# Patient Record
Sex: MALE
[De-identification: names, ages, dates, MRNs, and addresses within clinical notes are randomized per-mention and may not be internally consistent; named-entity substitution may affect disease eponyms.]

---

## 2024-05-20 PROBLEM — Z00.00 ENCOUNTER FOR PREVENTIVE HEALTH EXAMINATION: Status: ACTIVE | Noted: 2024-05-20

## 2024-05-21 ENCOUNTER — APPOINTMENT (OUTPATIENT)
Dept: GASTROENTEROLOGY | Facility: CLINIC | Age: 24
End: 2024-05-21
Payer: COMMERCIAL

## 2024-05-21 VITALS
DIASTOLIC BLOOD PRESSURE: 111 MMHG | RESPIRATION RATE: 14 BRPM | HEIGHT: 63 IN | HEART RATE: 63 BPM | BODY MASS INDEX: 24.45 KG/M2 | WEIGHT: 138 LBS | SYSTOLIC BLOOD PRESSURE: 170 MMHG | OXYGEN SATURATION: 98 %

## 2024-05-21 DIAGNOSIS — F17.210 NICOTINE DEPENDENCE, CIGARETTES, UNCOMPLICATED: ICD-10-CM

## 2024-05-21 DIAGNOSIS — R79.89 OTHER SPECIFIED ABNORMAL FINDINGS OF BLOOD CHEMISTRY: ICD-10-CM

## 2024-05-21 DIAGNOSIS — K92.0 HEMATEMESIS: ICD-10-CM

## 2024-05-21 DIAGNOSIS — Z78.9 OTHER SPECIFIED HEALTH STATUS: ICD-10-CM

## 2024-05-21 PROCEDURE — 99205 OFFICE O/P NEW HI 60 MIN: CPT

## 2024-05-21 NOTE — HISTORY OF PRESENT ILLNESS
[de-identified] : LISA CASTELAN is a 23 year old male with no significant PMH who presents today for evaluation of elevated LFTs and hematemesis. Labs 4/24/24 -  tbili 0.2, AST 41, ALT 30, . No abdominal imaging done. He reports a history of binge drinking, 6+ drinks/week. After drinking, he reports having 3 episodes of hematemesis. He has not gone to the ER. H/H 17.6/52.5. No previous EGD. Denies recent infection, abdominal pain or distension, jaundice, hematochezia, dark urine, confusion or unintentional weight loss. Denies otc/herbal supplements. Denies family history of liver disease.

## 2024-05-21 NOTE — PHYSICAL EXAM
[Non-Tender] : non-tender [Smooth] : smooth [General Appearance - Alert] : alert [General Appearance - In No Acute Distress] : in no acute distress [Sclera] : the sclera and conjunctiva were normal [Outer Ear] : the ears and nose were normal in appearance [Oropharynx] : the oropharynx was normal [Neck Appearance] : the appearance of the neck was normal [Bowel Sounds] : normal bowel sounds [Abdomen Soft] : soft [Abdomen Tenderness] : non-tender [] : no hepato-splenomegaly [Abdomen Mass (___ Cm)] : no abdominal mass palpated [Oriented To Time, Place, And Person] : oriented to person, place, and time [Scleral Icterus] : No Scleral Icterus [Spider Angioma] : No spider angioma(s) were observed [Abdominal  Ascites] : no ascites [Asterixis] : no asterixis observed [Jaundice] : No jaundice [Palmar Erythema] : no Palmar Erythema

## 2024-05-21 NOTE — REASON FOR VISIT
[Consultation] : a consultation visit [[employer] ] : provided by [employer]   [Time Spent: ____ minutes] : Total time spent using  services: [unfilled] minutes. The patient's primary language is not English thus required  services. [FreeTextEntry1] : elevated LFTs, hematemesis [Interpreters_IDNumber] : [id number] [Interpreters_FullName] : Naun [TWNoteComboBox1] : Salvadorean

## 2024-05-21 NOTE — ASSESSMENT
[FreeTextEntry1] : LISA CASTELAN is a 23 year old male with no significant PMH who presents today for evaluation of elevated LFTs and hematemesis.   Elevated LFTs -Labs 4/24/24 - tbili 0.2, AST 41, ALT 30, .  -likely alcohol associated hepatitis -Labs ordered to rule out competing etiologies of liver disease -CT Abdomen w/IVC ordered to further evaluate, given hematemesis -advised pt to cut down on alcohol consumption  Hematemesis -labs and CT ordered -f/u w/GI  Follow up in 6-8 weeks

## 2024-05-22 ENCOUNTER — LABORATORY RESULT (OUTPATIENT)
Age: 24
End: 2024-05-22

## 2024-05-23 ENCOUNTER — OFFICE (OUTPATIENT)
Dept: URBAN - METROPOLITAN AREA CLINIC 94 | Facility: CLINIC | Age: 24
Setting detail: OPHTHALMOLOGY
End: 2024-05-23
Payer: COMMERCIAL

## 2024-05-23 DIAGNOSIS — H11.041: ICD-10-CM

## 2024-05-23 DIAGNOSIS — H18.613: ICD-10-CM

## 2024-05-23 PROCEDURE — 92014 COMPRE OPH EXAM EST PT 1/>: CPT | Performed by: OPHTHALMOLOGY

## 2024-05-23 PROCEDURE — 92025 CPTRIZED CORNEAL TOPOGRAPHY: CPT | Performed by: OPHTHALMOLOGY

## 2024-05-23 ASSESSMENT — CONFRONTATIONAL VISUAL FIELD TEST (CVF)
OS_FINDINGS: FULL
OD_FINDINGS: FULL

## 2024-05-28 LAB
A1AT SERPL-MCNC: 130 MG/DL
AFP-TM SERPL-MCNC: 2.2 NG/ML
ALBUMIN SERPL ELPH-MCNC: 4.7 G/DL
ALP BLD-CCNC: 106 U/L
ALT SERPL-CCNC: 20 U/L
ANA SER IF-ACNC: NEGATIVE
ANION GAP SERPL CALC-SCNC: 15 MMOL/L
AST SERPL-CCNC: 24 U/L
BASOPHILS # BLD AUTO: 0.05 K/UL
BASOPHILS NFR BLD AUTO: 0.6 %
BILIRUB INDIRECT SERPL-MCNC: 0.2 MG/DL
BILIRUB SERPL-MCNC: 0.3 MG/DL
BUN SERPL-MCNC: 9 MG/DL
CALCIUM SERPL-MCNC: 9.6 MG/DL
CERULOPLASMIN SERPL-MCNC: 19 MG/DL
CHLORIDE SERPL-SCNC: 102 MMOL/L
CO2 SERPL-SCNC: 24 MMOL/L
CREAT SERPL-MCNC: 1.07 MG/DL
EGFR: 101 ML/MIN/1.73M2
EOSINOPHIL # BLD AUTO: 0.23 K/UL
EOSINOPHIL NFR BLD AUTO: 2.8 %
FERRITIN SERPL-MCNC: 60 NG/ML
GLUCOSE SERPL-MCNC: 98 MG/DL
HBV CORE IGG+IGM SER QL: NONREACTIVE
HBV SURFACE AB SER QL: NONREACTIVE
HBV SURFACE AG SER QL: NONREACTIVE
HCT VFR BLD CALC: 48.1 %
HCV AB SER QL: NONREACTIVE
HCV S/CO RATIO: 0.12 S/CO
HEPATITIS A IGG ANTIBODY: REACTIVE
HGB BLD-MCNC: 16.7 G/DL
IGG SER QL IEP: 863 MG/DL
IMM GRANULOCYTES NFR BLD AUTO: 0.4 %
INR PPP: 0.91 RATIO
IRON SATN MFR SERPL: 15 %
IRON SERPL-MCNC: 46 UG/DL
LKM AB SER QL IF: <20.1 UNITS
LYMPHOCYTES # BLD AUTO: 2.8 K/UL
LYMPHOCYTES NFR BLD AUTO: 34.4 %
MAN DIFF?: NORMAL
MCHC RBC-ENTMCNC: 31.7 PG
MCHC RBC-ENTMCNC: 34.7 GM/DL
MCV RBC AUTO: 91.3 FL
MITOCHONDRIA AB SER IF-ACNC: NORMAL
MONOCYTES # BLD AUTO: 0.89 K/UL
MONOCYTES NFR BLD AUTO: 10.9 %
NEUTROPHILS # BLD AUTO: 4.15 K/UL
NEUTROPHILS NFR BLD AUTO: 50.9 %
NT-PROBNP SERPL-MCNC: <36 PG/ML
PLATELET # BLD AUTO: 214 K/UL
POTASSIUM SERPL-SCNC: 3.8 MMOL/L
PROT SERPL-MCNC: 7 G/DL
PT BLD: 10.3 SEC
RBC # BLD: 5.27 M/UL
RBC # FLD: 12.3 %
SMOOTH MUSCLE AB SER QL IF: NORMAL
SODIUM SERPL-SCNC: 141 MMOL/L
TIBC SERPL-MCNC: 316 UG/DL
TSH SERPL-ACNC: 1.89 UIU/ML
UIBC SERPL-MCNC: 270 UG/DL
WBC # FLD AUTO: 8.15 K/UL

## 2024-05-29 LAB — SOLUBLE LIVER IGG SER IA-ACNC: 0.7

## 2024-05-31 ENCOUNTER — APPOINTMENT (OUTPATIENT)
Dept: CT IMAGING | Facility: CLINIC | Age: 24
End: 2024-05-31
Payer: COMMERCIAL

## 2024-05-31 ENCOUNTER — OUTPATIENT (OUTPATIENT)
Dept: OUTPATIENT SERVICES | Facility: HOSPITAL | Age: 24
LOS: 1 days | End: 2024-05-31
Payer: COMMERCIAL

## 2024-05-31 DIAGNOSIS — K92.0 HEMATEMESIS: ICD-10-CM

## 2024-05-31 PROCEDURE — 74160 CT ABDOMEN W/CONTRAST: CPT | Mod: 26

## 2024-05-31 PROCEDURE — 74160 CT ABDOMEN W/CONTRAST: CPT

## 2024-06-24 ENCOUNTER — OFFICE (OUTPATIENT)
Dept: URBAN - METROPOLITAN AREA CLINIC 104 | Facility: CLINIC | Age: 24
Setting detail: OPHTHALMOLOGY
End: 2024-06-24
Payer: COMMERCIAL

## 2024-06-24 ENCOUNTER — RX ONLY (RX ONLY)
Age: 24
End: 2024-06-24

## 2024-06-24 DIAGNOSIS — H11.152: ICD-10-CM

## 2024-06-24 DIAGNOSIS — H18.613: ICD-10-CM

## 2024-06-24 DIAGNOSIS — H11.041: ICD-10-CM

## 2024-06-24 PROCEDURE — 99213 OFFICE O/P EST LOW 20 MIN: CPT | Performed by: OPHTHALMOLOGY

## 2024-06-24 PROCEDURE — 92025 CPTRIZED CORNEAL TOPOGRAPHY: CPT | Performed by: OPHTHALMOLOGY

## 2024-06-24 PROCEDURE — 76514 ECHO EXAM OF EYE THICKNESS: CPT | Performed by: OPHTHALMOLOGY

## 2024-06-24 PROCEDURE — 92285 EXTERNAL OCULAR PHOTOGRAPHY: CPT | Performed by: OPHTHALMOLOGY

## 2024-06-24 ASSESSMENT — CONFRONTATIONAL VISUAL FIELD TEST (CVF)
OS_FINDINGS: FULL
OD_FINDINGS: FULL

## 2024-06-25 PROBLEM — H18.611 KERATOCONUS, STABLE; RIGHT EYE: Status: ACTIVE | Noted: 2024-06-24

## 2024-08-06 ENCOUNTER — APPOINTMENT (OUTPATIENT)
Dept: GASTROENTEROLOGY | Facility: CLINIC | Age: 24
End: 2024-08-06

## 2024-08-06 PROCEDURE — 99214 OFFICE O/P EST MOD 30 MIN: CPT

## 2024-08-08 NOTE — HISTORY OF PRESENT ILLNESS
[de-identified] : LISA CASTELAN is a 23 year old male with no significant PMH who presents today for evaluation of elevated LFTs and hematemesis.   8/6/24: f/u visit. Labs and imaging reviewed w/pt. LFTs have normalized. Competing etiologies of liver disease have been ruled out including autoimmune liver disease, viral hepatitis, iron overload, Wilsons disease and A1AT deficiency. CT Abdomen w/IVC 5/31/24 was unremarkable. He has not made an appt w/GI to evaluate hematemesis yet.  5/21/24: Labs 4/24/24 -  tbili 0.2, AST 41, ALT 30, . No abdominal imaging done. He reports a history of binge drinking, 6+ drinks/week. After drinking, he reports having 3 episodes of hematemesis. He has not gone to the ER. H/H 17.6/52.5. No previous EGD. Denies recent infection, abdominal pain or distension, jaundice, hematochezia, dark urine, confusion or unintentional weight loss. Denies otc/herbal supplements. Denies family history of liver disease.

## 2024-08-08 NOTE — HISTORY OF PRESENT ILLNESS
[de-identified] : LISA CASTELAN is a 23 year old male with no significant PMH who presents today for evaluation of elevated LFTs and hematemesis.   8/6/24: f/u visit. Labs and imaging reviewed w/pt. LFTs have normalized. Competing etiologies of liver disease have been ruled out including autoimmune liver disease, viral hepatitis, iron overload, Wilsons disease and A1AT deficiency. CT Abdomen w/IVC 5/31/24 was unremarkable. He has not made an appt w/GI to evaluate hematemesis yet.  5/21/24: Labs 4/24/24 -  tbili 0.2, AST 41, ALT 30, . No abdominal imaging done. He reports a history of binge drinking, 6+ drinks/week. After drinking, he reports having 3 episodes of hematemesis. He has not gone to the ER. H/H 17.6/52.5. No previous EGD. Denies recent infection, abdominal pain or distension, jaundice, hematochezia, dark urine, confusion or unintentional weight loss. Denies otc/herbal supplements. Denies family history of liver disease.

## 2024-08-08 NOTE — REASON FOR VISIT
[Follow-Up: _____] : a [unfilled] follow-up visit [[employer] ] : provided by [employer]   [Time Spent: ____ minutes] : Total time spent using  services: [unfilled] minutes. The patient's primary language is not English thus required  services. [FreeTextEntry1] : elevated LFTs [Interpreters_IDNumber] : [id number] [Interpreters_FullName] : Beckie [TWNoteComboBox1] : Surinamese intact

## 2024-08-08 NOTE — REASON FOR VISIT
[Follow-Up: _____] : a [unfilled] follow-up visit [[employer] ] : provided by [employer]   [Time Spent: ____ minutes] : Total time spent using  services: [unfilled] minutes. The patient's primary language is not English thus required  services. [FreeTextEntry1] : elevated LFTs [Interpreters_IDNumber] : [id number] [Interpreters_FullName] : Beckie [TWNoteComboBox1] : Martiniquais

## 2024-08-08 NOTE — PHYSICAL EXAM
[Non-Tender] : non-tender [Smooth] : smooth [General Appearance - In No Acute Distress] : in no acute distress [General Appearance - Alert] : alert [Sclera] : the sclera and conjunctiva were normal [Outer Ear] : the ears and nose were normal in appearance [Oropharynx] : the oropharynx was normal [Neck Appearance] : the appearance of the neck was normal [Bowel Sounds] : normal bowel sounds [Abdomen Tenderness] : non-tender [Abdomen Soft] : soft [] : no hepato-splenomegaly [Abdomen Mass (___ Cm)] : no abdominal mass palpated [Oriented To Time, Place, And Person] : oriented to person, place, and time [Scleral Icterus] : No Scleral Icterus [Spider Angioma] : No spider angioma(s) were observed [Abdominal  Ascites] : no ascites [Asterixis] : no asterixis observed [Jaundice] : No jaundice [Palmar Erythema] : no Palmar Erythema

## 2024-08-08 NOTE — ASSESSMENT
[FreeTextEntry1] : LISA CASTELAN is a 23 year old male with no significant PMH who presents today for evaluation of elevated LFTs and hematemesis.   Elevated LFTs -LFTs have normalized. Competing etiologies of liver disease have been ruled out including autoimmune liver disease, viral hepatitis, iron overload, Wilsons disease and A1AT deficiency. CT Abdomen w/IVC 5/31/24 was unremarkable. -likely alcohol associated hepatitis that has now resolved -advised abstinence from alcohol -Follow up w/hepatology PRN  Hematemesis -f/u w/GI

## 2024-08-15 ENCOUNTER — ASC (OUTPATIENT)
Dept: URBAN - METROPOLITAN AREA SURGERY 8 | Facility: SURGERY | Age: 24
Setting detail: OPHTHALMOLOGY
End: 2024-08-15
Payer: COMMERCIAL

## 2024-08-15 DIAGNOSIS — H11.041: ICD-10-CM

## 2024-08-15 PROCEDURE — 68326 REVISE/GRAFT EYELID LINING: CPT | Mod: RT | Performed by: OPHTHALMOLOGY

## 2024-08-15 PROCEDURE — 65426 REMOVAL OF EYE LESION: CPT | Mod: RT | Performed by: OPHTHALMOLOGY

## 2024-08-16 ENCOUNTER — OFFICE (OUTPATIENT)
Dept: URBAN - METROPOLITAN AREA CLINIC 104 | Facility: CLINIC | Age: 24
Setting detail: OPHTHALMOLOGY
End: 2024-08-16
Payer: COMMERCIAL

## 2024-08-16 ENCOUNTER — APPOINTMENT (OUTPATIENT)
Dept: GASTROENTEROLOGY | Facility: CLINIC | Age: 24
End: 2024-08-16
Payer: COMMERCIAL

## 2024-08-16 VITALS — OXYGEN SATURATION: 98 % | HEIGHT: 63 IN | BODY MASS INDEX: 25.34 KG/M2 | RESPIRATION RATE: 14 BRPM | WEIGHT: 143 LBS

## 2024-08-16 VITALS — SYSTOLIC BLOOD PRESSURE: 140 MMHG | DIASTOLIC BLOOD PRESSURE: 100 MMHG | HEART RATE: 75 BPM

## 2024-08-16 DIAGNOSIS — K92.0 HEMATEMESIS: ICD-10-CM

## 2024-08-16 DIAGNOSIS — H11.041: ICD-10-CM

## 2024-08-16 DIAGNOSIS — Z78.9 OTHER SPECIFIED HEALTH STATUS: ICD-10-CM

## 2024-08-16 DIAGNOSIS — Z09 ENCOUNTER FOR FOLLOW-UP EXAMINATION AFTER COMPLETED TREATMENT FOR CONDITIONS OTHER THAN MALIGNANT NEOPLASM: ICD-10-CM

## 2024-08-16 DIAGNOSIS — R79.89 OTHER SPECIFIED ABNORMAL FINDINGS OF BLOOD CHEMISTRY: ICD-10-CM

## 2024-08-16 PROCEDURE — 99024 POSTOP FOLLOW-UP VISIT: CPT | Performed by: OPHTHALMOLOGY

## 2024-08-16 PROCEDURE — 99204 OFFICE O/P NEW MOD 45 MIN: CPT

## 2024-08-16 RX ORDER — HYDROXYZINE HYDROCHLORIDE 10 MG/1
10 TABLET ORAL
Refills: 0 | Status: ACTIVE | COMMUNITY

## 2024-08-16 RX ORDER — OMEPRAZOLE 40 MG/1
40 CAPSULE, DELAYED RELEASE ORAL
Qty: 30 | Refills: 5 | Status: ACTIVE | COMMUNITY
Start: 2024-08-16 | End: 1900-01-01

## 2024-08-16 RX ORDER — ACAMPROSATE CALCIUM 333 MG/1
333 TABLET, DELAYED RELEASE ORAL
Refills: 0 | Status: ACTIVE | COMMUNITY

## 2024-08-16 NOTE — ASSESSMENT
[FreeTextEntry1] : Impression: Hematemesis in a patient with daily alcohol consumption rule out alcohol induced ulcer disease and/or gastritis and/or esophagitis.  Recommendations: Omeprazole 40 mg once daily was prescribed pending upper endoscopy for further evaluation of the above.  The risk versus benefits of upper endoscopy and intravenous sedation, and alternative testing such as upper GI series, were individually explained to the patient today in Kyrgyz by myself who speaks Kyrgyz.  He appeared to understand all of the above and was agreeable to proceeding with upper endoscopy.  His ASA classification is 2 and he is medically optimized for the proposed upper endoscopy and seemed to understand all of the above instructions, information, and management plan.

## 2024-08-16 NOTE — REVIEW OF SYSTEMS
[As Noted in HPI] : as noted in HPI [Vomiting] : vomiting [Negative] : Heme/Lymph [Abdominal Pain] : no abdominal pain [Constipation] : no constipation [Diarrhea] : no diarrhea [Heartburn] : no heartburn [Melena (black stool)] : no melena [Bleeding] : no bleeding [Fecal Incontinence (soiling)] : no fecal incontinence [Bloating (gassiness)] : no bloating [FreeTextEntry7] : Hematemesis

## 2024-08-16 NOTE — PHYSICAL EXAM
[Alert] : alert [Normal Voice/Communication] : normal voice/communication [Healthy Appearing] : healthy appearing [No Acute Distress] : no acute distress [Well Developed] : well developed [Well Nourished] : well nourished [Hearing Threshold Finger Rub Not [place]yer] : hearing was normal [Sclera] : the sclera and conjunctiva were normal [Normal Lips/Gums] : the lips and gums were normal [Oropharynx] : the oropharynx was normal [Normal Appearance] : the appearance of the neck was normal [No Neck Mass] : no neck mass was observed [No Respiratory Distress] : no respiratory distress [No Acc Muscle Use] : no accessory muscle use [Respiration, Rhythm And Depth] : normal respiratory rhythm and effort [Auscultation Breath Sounds / Voice Sounds] : lungs were clear to auscultation bilaterally [Heart Rate And Rhythm] : heart rate was normal and rhythm regular [Normal S1, S2] : normal S1 and S2 [Murmurs] : no murmurs [None] : no edema [Bowel Sounds] : normal bowel sounds [Abdomen Tenderness] : non-tender [No Masses] : no abdominal mass palpated [Abdomen Soft] : soft [] : no hepatosplenomegaly [No CVA Tenderness] : no CVA  tenderness [Abnormal Walk] : normal gait [No Joint Swelling] : no joint swelling seen [Normal Color / Pigmentation] : normal skin color and pigmentation [Oriented To Time, Place, And Person] : oriented to person, place, and time [de-identified] : Deferred at this time

## 2024-08-22 ENCOUNTER — OFFICE (OUTPATIENT)
Dept: URBAN - METROPOLITAN AREA CLINIC 104 | Facility: CLINIC | Age: 24
Setting detail: OPHTHALMOLOGY
End: 2024-08-22
Payer: COMMERCIAL

## 2024-08-22 ENCOUNTER — RX ONLY (RX ONLY)
Age: 24
End: 2024-08-22

## 2024-08-22 DIAGNOSIS — H11.041: ICD-10-CM

## 2024-08-22 PROCEDURE — 99024 POSTOP FOLLOW-UP VISIT: CPT | Performed by: OPTOMETRIST

## 2024-08-22 ASSESSMENT — CONFRONTATIONAL VISUAL FIELD TEST (CVF)
OD_FINDINGS: FULL
OS_FINDINGS: FULL

## 2024-09-16 ENCOUNTER — OFFICE (OUTPATIENT)
Dept: URBAN - METROPOLITAN AREA CLINIC 104 | Facility: CLINIC | Age: 24
Setting detail: OPHTHALMOLOGY
End: 2024-09-16
Payer: COMMERCIAL

## 2024-09-16 DIAGNOSIS — H11.041: ICD-10-CM

## 2024-09-16 PROCEDURE — 99024 POSTOP FOLLOW-UP VISIT: CPT | Performed by: OPHTHALMOLOGY

## 2024-09-16 ASSESSMENT — CONFRONTATIONAL VISUAL FIELD TEST (CVF)
OS_FINDINGS: FULL
OD_FINDINGS: FULL

## 2024-09-29 ENCOUNTER — TRANSCRIPTION ENCOUNTER (OUTPATIENT)
Age: 24
End: 2024-09-29

## 2024-09-30 ENCOUNTER — APPOINTMENT (OUTPATIENT)
Dept: GASTROENTEROLOGY | Facility: GI CENTER | Age: 24
End: 2024-09-30
Payer: COMMERCIAL

## 2024-09-30 ENCOUNTER — RESULT REVIEW (OUTPATIENT)
Age: 24
End: 2024-09-30

## 2024-09-30 DIAGNOSIS — K92.0 HEMATEMESIS: ICD-10-CM

## 2024-09-30 DIAGNOSIS — K29.60 OTHER GASTRITIS W/OUT BLEEDING: ICD-10-CM

## 2024-09-30 PROCEDURE — 43239 EGD BIOPSY SINGLE/MULTIPLE: CPT

## 2024-09-30 NOTE — PHYSICAL EXAM
[Alert] : alert [Normal Voice/Communication] : normal voice/communication [Healthy Appearing] : healthy appearing [No Acute Distress] : no acute distress [Well Developed] : well developed [Well Nourished] : well nourished [Sclera] : the sclera and conjunctiva were normal [Hearing Threshold Finger Rub Not [place]] : hearing was normal [Normal Lips/Gums] : the lips and gums were normal [Oropharynx] : the oropharynx was normal [Normal Appearance] : the appearance of the neck was normal [No Neck Mass] : no neck mass was observed [No Respiratory Distress] : no respiratory distress [No Acc Muscle Use] : no accessory muscle use [Respiration, Rhythm And Depth] : normal respiratory rhythm and effort [Auscultation Breath Sounds / Voice Sounds] : lungs were clear to auscultation bilaterally [Heart Rate And Rhythm] : heart rate was normal and rhythm regular [Normal S1, S2] : normal S1 and S2 [Murmurs] : no murmurs [None] : no edema [Bowel Sounds] : normal bowel sounds [Abdomen Tenderness] : non-tender [No Masses] : no abdominal mass palpated [Abdomen Soft] : soft [] : no hepatosplenomegaly [No CVA Tenderness] : no CVA  tenderness [Abnormal Walk] : normal gait [No Joint Swelling] : no joint swelling seen [Normal Color / Pigmentation] : normal skin color and pigmentation [Oriented To Time, Place, And Person] : oriented to person, place, and time [de-identified] : Deferred at this time

## 2024-09-30 NOTE — REVIEW OF SYSTEMS
[As Noted in HPI] : as noted in HPI [Abdominal Pain] : no abdominal pain [Vomiting] : vomiting [Constipation] : no constipation [Diarrhea] : no diarrhea [Heartburn] : no heartburn [Melena (black stool)] : no melena [Bleeding] : no bleeding [Fecal Incontinence (soiling)] : no fecal incontinence [Bloating (gassiness)] : no bloating [Swollen Glands] : no swollen glands [Negative] : Heme/Lymph [FreeTextEntry7] : Hematemesis

## 2024-10-23 ENCOUNTER — OFFICE (OUTPATIENT)
Dept: URBAN - METROPOLITAN AREA CLINIC 104 | Facility: CLINIC | Age: 24
Setting detail: OPHTHALMOLOGY
End: 2024-10-23
Payer: COMMERCIAL

## 2024-10-23 DIAGNOSIS — H11.041: ICD-10-CM

## 2024-10-23 PROCEDURE — 99024 POSTOP FOLLOW-UP VISIT: CPT | Performed by: OPHTHALMOLOGY

## 2024-10-23 ASSESSMENT — PACHYMETRY
OD_CT_CORRECTION: 4
OS_CT_CORRECTION: 4
OD_CT_UM: 480
OS_CT_UM: 488

## 2024-10-23 ASSESSMENT — VISUAL ACUITY
OS_BCVA: 20/20
OD_BCVA: 20/20

## 2024-10-23 ASSESSMENT — CONFRONTATIONAL VISUAL FIELD TEST (CVF)
OS_FINDINGS: FULL
OD_FINDINGS: FULL

## 2024-10-23 ASSESSMENT — TONOMETRY
OD_IOP_MMHG: 16
OS_IOP_MMHG: 16

## 2025-01-22 ENCOUNTER — OFFICE (OUTPATIENT)
Dept: URBAN - METROPOLITAN AREA CLINIC 104 | Facility: CLINIC | Age: 25
Setting detail: OPHTHALMOLOGY
End: 2025-01-22
Payer: COMMERCIAL

## 2025-01-22 DIAGNOSIS — H11.041: ICD-10-CM

## 2025-01-22 DIAGNOSIS — H18.611: ICD-10-CM

## 2025-01-22 PROCEDURE — 99213 OFFICE O/P EST LOW 20 MIN: CPT | Performed by: OPHTHALMOLOGY

## 2025-01-22 PROCEDURE — 92025 CPTRIZED CORNEAL TOPOGRAPHY: CPT | Performed by: OPHTHALMOLOGY

## 2025-01-22 ASSESSMENT — PACHYMETRY
OS_CT_UM: 488
OD_CT_UM: 480
OS_CT_CORRECTION: 4
OD_CT_CORRECTION: 4

## 2025-01-22 ASSESSMENT — REFRACTION_AUTOREFRACTION
OD_AXIS: 066
OD_CYLINDER: -0.50
OS_CYLINDER: -0.25
OS_SPHERE: +0.50
OD_SPHERE: +0.25
OS_AXIS: 103

## 2025-01-22 ASSESSMENT — VISUAL ACUITY
OD_BCVA: 20/20
OS_BCVA: 20/20

## 2025-01-22 ASSESSMENT — KERATOMETRY
OD_AXISANGLE_DEGREES: 101
OD_K1POWER_DIOPTERS: 45.18
OD_K2POWER_DIOPTERS: 45.73
OS_K1POWER_DIOPTERS: 45.00
OS_AXISANGLE_DEGREES: 099
OS_K2POWER_DIOPTERS: 45.79

## 2025-01-22 ASSESSMENT — TONOMETRY
OD_IOP_MMHG: 14
OS_IOP_MMHG: 14

## 2025-01-22 ASSESSMENT — CONFRONTATIONAL VISUAL FIELD TEST (CVF)
OD_FINDINGS: FULL
OS_FINDINGS: FULL